# Patient Record
Sex: MALE | Race: WHITE | Employment: FULL TIME | URBAN - METROPOLITAN AREA
[De-identification: names, ages, dates, MRNs, and addresses within clinical notes are randomized per-mention and may not be internally consistent; named-entity substitution may affect disease eponyms.]

---

## 2019-11-09 ENCOUNTER — HOSPITAL ENCOUNTER (EMERGENCY)
Facility: CLINIC | Age: 54
Discharge: HOME | End: 2019-11-09
Attending: EMERGENCY MEDICINE | Admitting: EMERGENCY MEDICINE
Payer: COMMERCIAL

## 2019-11-09 ENCOUNTER — APPOINTMENT (OUTPATIENT)
Dept: CT IMAGING | Facility: CLINIC | Age: 54
End: 2019-11-09
Attending: EMERGENCY MEDICINE
Payer: COMMERCIAL

## 2019-11-09 VITALS
OXYGEN SATURATION: 100 % | TEMPERATURE: 98.2 F | SYSTOLIC BLOOD PRESSURE: 116 MMHG | HEART RATE: 73 BPM | RESPIRATION RATE: 20 BRPM | DIASTOLIC BLOOD PRESSURE: 85 MMHG

## 2019-11-09 DIAGNOSIS — K27.9 PUD (PEPTIC ULCER DISEASE): ICD-10-CM

## 2019-11-09 DIAGNOSIS — R10.13 EPIGASTRIC PAIN: ICD-10-CM

## 2019-11-09 LAB
ALBUMIN SERPL-MCNC: 3.9 G/DL (ref 3.4–5)
ALP SERPL-CCNC: 85 U/L (ref 40–150)
ALT SERPL W P-5'-P-CCNC: 38 U/L (ref 0–70)
ANION GAP SERPL CALCULATED.3IONS-SCNC: 6 MMOL/L (ref 3–14)
AST SERPL W P-5'-P-CCNC: 19 U/L (ref 0–45)
BASOPHILS # BLD AUTO: 0.1 10E9/L (ref 0–0.2)
BASOPHILS NFR BLD AUTO: 0.9 %
BILIRUB SERPL-MCNC: 1.3 MG/DL (ref 0.2–1.3)
BUN SERPL-MCNC: 15 MG/DL (ref 7–30)
CALCIUM SERPL-MCNC: 9.5 MG/DL (ref 8.5–10.1)
CHLORIDE SERPL-SCNC: 104 MMOL/L (ref 94–109)
CO2 SERPL-SCNC: 28 MMOL/L (ref 20–32)
CREAT SERPL-MCNC: 1.05 MG/DL (ref 0.66–1.25)
DIFFERENTIAL METHOD BLD: NORMAL
EOSINOPHIL # BLD AUTO: 0.2 10E9/L (ref 0–0.7)
EOSINOPHIL NFR BLD AUTO: 2.8 %
ERYTHROCYTE [DISTWIDTH] IN BLOOD BY AUTOMATED COUNT: 11.9 % (ref 10–15)
GFR SERPL CREATININE-BSD FRML MDRD: 80 ML/MIN/{1.73_M2}
GLUCOSE SERPL-MCNC: 94 MG/DL (ref 70–99)
HCT VFR BLD AUTO: 48.3 % (ref 40–53)
HEMOCCULT STL QL: NEGATIVE
HGB BLD-MCNC: 16.6 G/DL (ref 13.3–17.7)
IMM GRANULOCYTES # BLD: 0.1 10E9/L (ref 0–0.4)
IMM GRANULOCYTES NFR BLD: 0.8 %
LIPASE SERPL-CCNC: 183 U/L (ref 73–393)
LYMPHOCYTES # BLD AUTO: 1.9 10E9/L (ref 0.8–5.3)
LYMPHOCYTES NFR BLD AUTO: 24.9 %
MCH RBC QN AUTO: 31 PG (ref 26.5–33)
MCHC RBC AUTO-ENTMCNC: 34.4 G/DL (ref 31.5–36.5)
MCV RBC AUTO: 90 FL (ref 78–100)
MONOCYTES # BLD AUTO: 0.7 10E9/L (ref 0–1.3)
MONOCYTES NFR BLD AUTO: 8.8 %
NEUTROPHILS # BLD AUTO: 4.7 10E9/L (ref 1.6–8.3)
NEUTROPHILS NFR BLD AUTO: 61.8 %
NRBC # BLD AUTO: 0 10*3/UL
NRBC BLD AUTO-RTO: 0 /100
PLATELET # BLD AUTO: 306 10E9/L (ref 150–450)
POTASSIUM SERPL-SCNC: 3.9 MMOL/L (ref 3.4–5.3)
PROT SERPL-MCNC: 7.5 G/DL (ref 6.8–8.8)
RBC # BLD AUTO: 5.36 10E12/L (ref 4.4–5.9)
SODIUM SERPL-SCNC: 138 MMOL/L (ref 133–144)
TROPONIN I SERPL-MCNC: <0.015 UG/L (ref 0–0.04)
WBC # BLD AUTO: 7.6 10E9/L (ref 4–11)

## 2019-11-09 PROCEDURE — 85025 COMPLETE CBC W/AUTO DIFF WBC: CPT | Performed by: EMERGENCY MEDICINE

## 2019-11-09 PROCEDURE — 93005 ELECTROCARDIOGRAM TRACING: CPT

## 2019-11-09 PROCEDURE — 25000128 H RX IP 250 OP 636: Performed by: EMERGENCY MEDICINE

## 2019-11-09 PROCEDURE — 96360 HYDRATION IV INFUSION INIT: CPT

## 2019-11-09 PROCEDURE — 99285 EMERGENCY DEPT VISIT HI MDM: CPT | Mod: 25

## 2019-11-09 PROCEDURE — 83690 ASSAY OF LIPASE: CPT | Performed by: EMERGENCY MEDICINE

## 2019-11-09 PROCEDURE — 25800030 ZZH RX IP 258 OP 636: Performed by: EMERGENCY MEDICINE

## 2019-11-09 PROCEDURE — 74177 CT ABD & PELVIS W/CONTRAST: CPT

## 2019-11-09 PROCEDURE — 84484 ASSAY OF TROPONIN QUANT: CPT | Performed by: EMERGENCY MEDICINE

## 2019-11-09 PROCEDURE — 82272 OCCULT BLD FECES 1-3 TESTS: CPT | Performed by: EMERGENCY MEDICINE

## 2019-11-09 PROCEDURE — 25000125 ZZHC RX 250: Performed by: EMERGENCY MEDICINE

## 2019-11-09 PROCEDURE — 80053 COMPREHEN METABOLIC PANEL: CPT | Performed by: EMERGENCY MEDICINE

## 2019-11-09 RX ORDER — IOPAMIDOL 755 MG/ML
500 INJECTION, SOLUTION INTRAVASCULAR ONCE
Status: COMPLETED | OUTPATIENT
Start: 2019-11-09 | End: 2019-11-09

## 2019-11-09 RX ORDER — SUCRALFATE ORAL 1 G/10ML
1 SUSPENSION ORAL 4 TIMES DAILY PRN
Qty: 420 ML | Refills: 1 | Status: SHIPPED | OUTPATIENT
Start: 2019-11-09

## 2019-11-09 RX ADMIN — SODIUM CHLORIDE 64 ML: 9 INJECTION, SOLUTION INTRAVENOUS at 16:03

## 2019-11-09 RX ADMIN — SODIUM CHLORIDE 1000 ML: 9 INJECTION, SOLUTION INTRAVENOUS at 15:13

## 2019-11-09 RX ADMIN — IOPAMIDOL 95 ML: 755 INJECTION, SOLUTION INTRAVENOUS at 16:03

## 2019-11-09 ASSESSMENT — ENCOUNTER SYMPTOMS
DYSURIA: 0
BLOOD IN STOOL: 0
HEMATURIA: 0
NAUSEA: 1
SHORTNESS OF BREATH: 0
VOMITING: 0
ABDOMINAL PAIN: 1

## 2019-11-09 NOTE — ED PROVIDER NOTES
History     Chief Complaint:  Abdominal Pain    HPI   Armando Elliott is a 53 year old male, with a history of s/p cholecystectomy, who presents with his wife to the ED for evaluation of upper abdominal pain. The patient reports he has been having abdominal pain for the past week. The pain worsens with eating. No pain with deep breaths. The pain is similar prior to when he had his cholecystectomy. He has associated nausea. His stool has been darker and greaser than usual but no blood in stool. The patient notes he did fall off his ladder (approximately 4ft) and landed on his toolbox, injuring his right chest and bruising it 1.5 weeks ago. He has been taking Aspirin and is now taking Naproxen for the pain. The patient reports he has had an unremarkable colonoscopy. The patient denies any blood thinners, shortness of breath, vomiting, dysuria, or hematuria. He denies history of blood clots, kidney stones, bowel obstruction, hypertension, diabetes, or appendectomy. He does have elevated cholesterol but is not on medication. Of note, the patient's wife reports his family has a significant history of pancreatic cancer.      Allergies:  Penicillins  Sulfa drugs      Medications:    The patient is not currently taking any prescribed medications.    Past Medical History:    Cholecystitis w/ cholelithiasis     Past Surgical History:    Cholecystectomy  T&A    Family History:    History reviewed. No pertinent family history.     Social History:  Smoking status: Never smoker    Alcohol use: Yes  Presents to ED with wife    Marital Status:   [2]     Review of Systems   Respiratory: Negative for shortness of breath.    Gastrointestinal: Positive for abdominal pain and nausea. Negative for blood in stool and vomiting.   Genitourinary: Negative for dysuria and hematuria.   All other systems reviewed and are negative.    Physical Exam     Patient Vitals for the past 24 hrs:   BP Temp Temp src Pulse Heart Rate Resp SpO2    11/09/19 1635 116/85 -- -- -- 76 20 100 %   11/09/19 1630 116/85 -- -- -- -- -- --   11/09/19 1615 111/81 -- -- 73 -- -- 100 %   11/09/19 1600 -- -- -- -- -- -- 97 %   11/09/19 1433 (!) 140/100 98.2  F (36.8  C) Oral 88 -- 20 98 %     Physical Exam  General: Alert, appears well-developed and well-nourished. Cooperative.     In mild distress  HEENT:  Head:  Atraumatic  Ears:  External ears are normal  Mouth/Throat:  Oropharynx is without erythema or exudate and mucous membranes are moist.   Eyes:   Conjunctivae normal and EOM are normal. No scleral icterus.  CV:  Normal rate, regular rhythm, normal heart sounds and radial pulses are 2+ and symmetric.  No murmur.  Resp:  Breath sounds are clear bilaterally    Non-labored, no retractions or accessory muscle use  GI:  Abdomen is soft, no distension, no tenderness. No rebound or guarding.  No CVA tenderness bilaterally  Rectal:  No hemorrhoids. No detectable rectal masses. Normal stool color appearance. Nontender on FREDRICK.   MS:  Normal range of motion. No edema.    Normal strength in all 4 extremities.     Back atraumatic.    No midline cervical, thoracic, or lumbar tenderness  Skin:  Warm and dry. Resolving bruises to bilateral lower chest wall   Neuro:   Alert. Normal strength. GCS: 15  Psych: Normal mood and affect.    Emergency Department Course     ECG (14:38:06):  Rate 83 bpm. OK interval 146. QRS duration 84. QT/QTc 348/408. P-R-T axes 46-3 -2. Normal sinus rhythm. Normal ECG. No significant change compared to EKG dated 1/22/13. Interpreted at 1440 by Tate Jensen MD.    Imaging:  Radiographic findings were communicated with the patient and family who voiced understanding of the findings.    CT Abdomen Pelvis w Contrast  IMPRESSION:  1. Stable small nodule in the right lower lung lobe since 2013. This  is a benign finding.  2. Postop changes status post cholecystectomy.  3. Mild thickening of the wall of the pylorus is likely due to incomplete distention.  4. No  definite etiology for patient's symptoms is seen. No evidence  for bowel obstruction. No inflammatory changes are seen in the  epigastric region of the abdomen. There is no evidence for  choledocholithiasis or intrahepatic biliary ductal dilatation.   As read by Radiology.     Laboratory:  Laboratory findings were communicated with the patient and family who voiced understanding of the findings.    Occult blood stool: Negative     Troponin I (1447): <0.015    Lipase: 183    CBC: o/w WNL (WBC 7.6, HGB 16.6, )  CMP: o/w WNL (Creatinine 1.05)     Interventions:  1513: NS 1L Bolus IV    Emergency Department Course:  1438: EKG obtained.     Past medical records, nursing notes, and vitals reviewed.  1444: I performed an exam of the patient and obtained history, as documented above.    1447: IV inserted and blood drawn.    1455: Rectal exam was performed here in the emergency department.     The patient was sent for an abdomen pelvis CT while in the emergency department, findings above.    1706: I rechecked the patient. Explained findings to patient and wife.    Findings and plan explained to the Patient and spouse. Patient discharged home with instructions regarding supportive care, medications, and reasons to return. The importance of close follow-up was reviewed. The patient was prescribed Omeprazole and Carafate.     Impression & Plan      Medical Decision Making:  Armando Elliott is a 53 year old male who presents for evaluation of upper abdominal pain.  I considered a broad differential diagnosis for this patient including gastritis, GERD, biliary colic, pancreatitis, colonic or small bowel pathology, vascular etiologies including aneurysm, ulcer.  Signs and symptoms here are consistent with gastritis vs PUD. CT was obtained due to significant family history significant for pancreatic cancer and patient/wife concern for sinister malignant pathologies as a cause of his abdominal pain. This CT was  unremarkable.  Right lung nodule incidental finding communicated to patient and wife and its unchanged appearance from 2013 (F/u with PCP regarding this incidental finding). There are no signs of any serious etiologies as mentioned above or intraabdominal catastrophes.  I highly doubt this is a PE or acute coronary syndrome and as he is tender in the abdomen, would not do any further workup for this.  Doubt perforated ulcer at this point.  Will refer back to primary.  Consider endoscopy if further symptoms despite this.  Gastritis/PUD precautions given for home.      Diagnosis:    ICD-10-CM   1. PUD (peptic ulcer disease) K27.9   2. Epigastric pain R10.13     Disposition: Patient discharged to home with wife      Discharge Medications:  New Prescriptions    OMEPRAZOLE (PRILOSEC) 20 MG DR CAPSULE    Take 1 capsule (20 mg) by mouth daily    SUCRALFATE (CARAFATE) 1 GM/10ML SUSPENSION    Take 10 mLs (1 g) by mouth 4 times daily as needed for nausea (epigastric pain)     Heather White  11/9/2019   [facility] EMERGENCY DEPARTMENT    Scribe Disclosure:  I, Heather White, am serving as a scribe at 2:44 PM on 11/9/2019 to document services personally performed by Tate Jensen MD based on my observations and the provider's statements to me.        Tate Jensen MD  11/09/19 [id number]

## 2019-11-09 NOTE — ED AVS SNAPSHOT
[facility] Emergency Department  [address]  [facility] [address]  Phone:  [phone]  Fax:  [phone]                                    Armando Elliott   MRN: [id number]    Department:  [facility] Emergency Department   Date of Visit:  11/9/2019           After Visit Summary Signature Page    I have received my discharge instructions, and my questions have been answered. I have discussed any challenges I see with this plan with the nurse or doctor.    ..........................................................................................................................................  Patient/Patient Representative Signature      ..........................................................................................................................................  Patient Representative Print Name and Relationship to Patient    ..................................................               ................................................  Date                                   Time    ..........................................................................................................................................  Reviewed by Signature/Title    ...................................................              ..............................................  Date                                               Time          22EPIC Rev 08/18

## 2019-11-09 NOTE — ED TRIAGE NOTES
"Arrives with upper abdomen pain since Wednesday, also having nausea and increased pain after eating. States he fell about 4 feet and hit that area a couple of days prior to the start of the pain. Also reports his stools are \"greasy\" and dark in color. Alert and oriented, ABCs intact.  "

## 2019-11-11 LAB — INTERPRETATION ECG - MUSE: NORMAL

## 2022-06-26 ENCOUNTER — OFFICE VISIT (OUTPATIENT)
Dept: URGENT CARE | Facility: URGENT CARE | Age: 57
End: 2022-06-26
Payer: COMMERCIAL

## 2022-06-26 VITALS
WEIGHT: 195 LBS | TEMPERATURE: 98.9 F | BODY MASS INDEX: 29.65 KG/M2 | RESPIRATION RATE: 12 BRPM | SYSTOLIC BLOOD PRESSURE: 116 MMHG | HEART RATE: 88 BPM | OXYGEN SATURATION: 96 % | DIASTOLIC BLOOD PRESSURE: 80 MMHG

## 2022-06-26 DIAGNOSIS — R07.0 THROAT DISCOMFORT: Primary | ICD-10-CM

## 2022-06-26 PROCEDURE — 99203 OFFICE O/P NEW LOW 30 MIN: CPT | Performed by: FAMILY MEDICINE
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
